# Patient Record
Sex: MALE | Race: WHITE
[De-identification: names, ages, dates, MRNs, and addresses within clinical notes are randomized per-mention and may not be internally consistent; named-entity substitution may affect disease eponyms.]

---

## 2018-07-18 ENCOUNTER — HOSPITAL ENCOUNTER (EMERGENCY)
Dept: HOSPITAL 25 - UCCORT | Age: 38
Discharge: HOME | End: 2018-07-18
Payer: COMMERCIAL

## 2018-07-18 VITALS — SYSTOLIC BLOOD PRESSURE: 120 MMHG | DIASTOLIC BLOOD PRESSURE: 78 MMHG

## 2018-07-18 DIAGNOSIS — F17.210: ICD-10-CM

## 2018-07-18 DIAGNOSIS — J20.9: Primary | ICD-10-CM

## 2018-07-18 DIAGNOSIS — Z88.1: ICD-10-CM

## 2018-07-18 PROCEDURE — 71046 X-RAY EXAM CHEST 2 VIEWS: CPT

## 2018-07-18 PROCEDURE — G0463 HOSPITAL OUTPT CLINIC VISIT: HCPCS

## 2018-07-18 PROCEDURE — 99213 OFFICE O/P EST LOW 20 MIN: CPT

## 2018-07-18 NOTE — RAD
INDICATION: Cough and fever



COMPARISON: None

 

TECHNIQUE: PA and lateral dual-energy views were obtained.



FINDINGS: 



Bones/Soft Tissues: There are no acute bony findings.



Cardiomediastinal: The cardiomediastinal silhouette is normal. 



Lungs: There are no infiltrates.



Pleura: There are no pleural effusions. 



Other: None



IMPRESSION:  NO ACTIVE DISEASE.

## 2025-01-13 NOTE — UC
Respiratory Complaint HPI





- HPI Summary


HPI Summary: 





37 yo male with a 3-4 day hx of f/c, cough and congestion


no n/v


no CP or SOB











- History of Current Complaint


Chief Complaint: UCRespiratory


Stated Complaint: COUGH


Time Seen by Provider: 07/18/18 18:22


Hx Obtained From: Patient


Onset/Duration: Gradual Onset


Timing: Constant


Severity Initially: Mild


Severity Currently: Moderate


Pain Intensity: 0


Pain Scale Used: 0-10 Numeric


Character: Cough: Productive


Aggravating Factors: Nothing


Associated Signs And Symptoms: Positive: Sinus Discomfort





- Allergies/Home Medications


Allergies/Adverse Reactions: 


 Allergies











Allergy/AdvReac Type Severity Reaction Status Date / Time


 


MS Ofloxacin [From Floxin] Allergy  Rash Verified 05/22/16 15:37











Home Medications: 


 Home Medications





Ibuprofen TAB* [Motrin TAB* 800 MG] 800 mg PO ONCE 07/18/18 [History Confirmed 

07/18/18]


guaiFENesin ER TAB [Mucinex*] 600 mg PO BID 07/18/18 [History Confirmed 07/18/18

]











PMH/Surg Hx/FS Hx/Imm Hx


Previously Healthy: Yes





- Surgical History


Surgical History: Yes


Surgery Procedure, Year, and Place: Right arm repair S/P accident





- Family History


Known Family History: Positive: None - no bowel diseases, Other - stomach CA/

ovarian CA





- Social History


Alcohol Use: Occasionally


Substance Use Type: Marijuana


Substance Use Comment - Amount & Last Used: 6/19/14


Smoking Status (MU): Current Every Day Smoker


Type: Cigarettes


Amount Used/How Often: 1 ppd


Length of Time of Smoking/Using Tobacco: ~ 20 years





- Immunization History


Most Recent Influenza Vaccination: no





Review of Systems


Constitutional: Fever


Skin: Negative


Eyes: Negative


ENT: Nasal Discharge, Sinus Congestion


Respiratory: Cough


Cardiovascular: Negative


Gastrointestinal: Negative


Genitourinary: Negative


Motor: Negative


Neurovascular: Negative


Musculoskeletal: Negative


Neurological: Negative


Psychological: Negative


Is Patient Immunocompromised?: No


All Other Systems Reviewed And Are Negative: Yes





Physical Exam


Triage Information Reviewed: Yes


Appearance: Well-Appearing, No Pain Distress, Well-Nourished


Vital Signs: 


 Initial Vital Signs











Temp  98.7 F   07/18/18 18:15


 


Pulse  86   07/18/18 18:15


 


Resp  18   07/18/18 18:15


 


BP  120/78   07/18/18 18:15


 


Pulse Ox  99   07/18/18 18:15











Vital Signs Reviewed: Yes


Eyes: Positive: Conjunctiva Clear


ENT: Positive: Hearing grossly normal, Nasal congestion, Nasal drainage, TMs 

normal, Uvula midline.  Negative: Pharyngeal erythema, Tonsillar swelling, 

Tonsillar exudate, Trismus, Muffled voice


Neck: Positive: Supple, Nontender, No Lymphadenopathy


Respiratory: Positive: No respiratory distress, No accessory muscle use, Rhonchi


Cardiovascular: Positive: RRR, No Murmur


Abdomen Description: Positive: Nontender, No Organomegaly.  Negative: CVA 

Tenderness (R), CVA Tenderness (L)


Musculoskeletal: Positive: ROM Intact, No Edema


Neurological: Positive: Alert


Psychological Exam: Normal


Skin Exam: Normal





[facility] Diagnostic Evaluation





- Laboratory


O2 Sat by Pulse Oximetry: 99 - normal/not hypoxic





- Radiology


Xray Interpretation: No Acute Changes


Radiology Interpretation Completed By: Radiologist





Re-Evaluation





- Re-Evaluation


  ** First Eval


Re-Evaluation Time: 19:55


Change: Improved - lungs clear





Respiratory Course/Dx





- Differential Dx/Diagnosis


Provider Diagnoses: acute bronchitis with bronchospasm





Discharge





- Sign-Out/Discharge


Documenting (check all that apply): Patient Departure





- Discharge Plan


Condition: Stable


Disposition: HOME


Patient Education Materials:  Acute Bronchitis (ED)


Forms:  *Work Release


Referrals: 


No Primary Care Phys,NOPCP [Primary Care Provider] - 


Additional Instructions: 


recheck for new or worsening symptoms





recheck in 4 days if not feeling back to normal





- Billing Disposition and Condition


Condition: STABLE


Disposition: Home
pink note